# Patient Record
Sex: MALE | Race: WHITE | NOT HISPANIC OR LATINO | Employment: OTHER | URBAN - METROPOLITAN AREA
[De-identification: names, ages, dates, MRNs, and addresses within clinical notes are randomized per-mention and may not be internally consistent; named-entity substitution may affect disease eponyms.]

---

## 2024-04-05 PROBLEM — M54.17 LUMBOSACRAL RADICULOPATHY AT L4: Status: ACTIVE | Noted: 2024-04-05

## 2024-04-05 PROBLEM — M19.90 DJD (DEGENERATIVE JOINT DISEASE): Status: ACTIVE | Noted: 2024-04-05

## 2024-04-05 PROBLEM — M25.511 RIGHT SHOULDER PAIN: Status: ACTIVE | Noted: 2024-04-05

## 2024-04-05 PROBLEM — M10.9 GOUT: Status: ACTIVE | Noted: 2024-04-05

## 2024-04-05 PROBLEM — E78.5 HYPERLIPIDEMIA: Status: ACTIVE | Noted: 2024-04-05

## 2024-04-05 PROBLEM — M25.551 RIGHT HIP PAIN: Status: ACTIVE | Noted: 2024-04-05

## 2024-04-05 PROBLEM — M41.9 SCOLIOSIS: Status: ACTIVE | Noted: 2024-04-05

## 2024-04-05 PROBLEM — I82.409 DVT (DEEP VENOUS THROMBOSIS) (MULTI): Status: ACTIVE | Noted: 2024-04-05

## 2024-04-05 PROBLEM — M25.561 RIGHT KNEE PAIN: Status: ACTIVE | Noted: 2024-04-05

## 2024-04-05 PROBLEM — M48.061 SPINAL STENOSIS, LUMBAR: Status: ACTIVE | Noted: 2024-04-05

## 2024-04-05 PROBLEM — M53.9 MULTILEVEL DEGENERATIVE DISC DISEASE: Status: ACTIVE | Noted: 2024-04-05

## 2024-04-05 PROBLEM — R73.02 GLUCOSE INTOLERANCE (IMPAIRED GLUCOSE TOLERANCE): Status: ACTIVE | Noted: 2024-04-05

## 2024-04-16 ENCOUNTER — OFFICE VISIT (OUTPATIENT)
Dept: CARDIOLOGY | Facility: CLINIC | Age: 69
End: 2024-04-16
Payer: MEDICARE

## 2024-04-16 VITALS
BODY MASS INDEX: 34.26 KG/M2 | SYSTOLIC BLOOD PRESSURE: 163 MMHG | OXYGEN SATURATION: 96 % | DIASTOLIC BLOOD PRESSURE: 82 MMHG | WEIGHT: 232 LBS | HEART RATE: 66 BPM

## 2024-04-16 DIAGNOSIS — E66.9 OBESITY, CLASS I, BMI 30-34.9: ICD-10-CM

## 2024-04-16 DIAGNOSIS — I10 ESSENTIAL HYPERTENSION: ICD-10-CM

## 2024-04-16 DIAGNOSIS — E78.5 HYPERLIPIDEMIA, UNSPECIFIED HYPERLIPIDEMIA TYPE: Primary | ICD-10-CM

## 2024-04-16 PROBLEM — M17.11 PRIMARY OSTEOARTHRITIS OF RIGHT KNEE: Status: ACTIVE | Noted: 2021-09-10

## 2024-04-16 PROBLEM — Z86.718 HISTORY OF DVT (DEEP VEIN THROMBOSIS): Status: ACTIVE | Noted: 2024-04-16

## 2024-04-16 PROCEDURE — 3008F BODY MASS INDEX DOCD: CPT | Performed by: STUDENT IN AN ORGANIZED HEALTH CARE EDUCATION/TRAINING PROGRAM

## 2024-04-16 PROCEDURE — 3079F DIAST BP 80-89 MM HG: CPT | Performed by: STUDENT IN AN ORGANIZED HEALTH CARE EDUCATION/TRAINING PROGRAM

## 2024-04-16 PROCEDURE — 99213 OFFICE O/P EST LOW 20 MIN: CPT | Performed by: STUDENT IN AN ORGANIZED HEALTH CARE EDUCATION/TRAINING PROGRAM

## 2024-04-16 PROCEDURE — 4004F PT TOBACCO SCREEN RCVD TLK: CPT | Performed by: STUDENT IN AN ORGANIZED HEALTH CARE EDUCATION/TRAINING PROGRAM

## 2024-04-16 PROCEDURE — 1159F MED LIST DOCD IN RCRD: CPT | Performed by: STUDENT IN AN ORGANIZED HEALTH CARE EDUCATION/TRAINING PROGRAM

## 2024-04-16 PROCEDURE — 93005 ELECTROCARDIOGRAM TRACING: CPT | Performed by: STUDENT IN AN ORGANIZED HEALTH CARE EDUCATION/TRAINING PROGRAM

## 2024-04-16 PROCEDURE — 3077F SYST BP >= 140 MM HG: CPT | Performed by: STUDENT IN AN ORGANIZED HEALTH CARE EDUCATION/TRAINING PROGRAM

## 2024-04-16 RX ORDER — IBUPROFEN 800 MG/1
800 TABLET ORAL EVERY 6 HOURS PRN
COMMUNITY

## 2024-04-16 RX ORDER — DULOXETIN HYDROCHLORIDE 30 MG/1
30 CAPSULE, DELAYED RELEASE ORAL DAILY
COMMUNITY

## 2024-04-16 RX ORDER — ACETAMINOPHEN 500 MG
2000 TABLET ORAL
COMMUNITY
Start: 2023-05-25

## 2024-04-16 RX ORDER — AMOXICILLIN 500 MG/1
CAPSULE ORAL
COMMUNITY
Start: 2023-11-29

## 2024-04-16 RX ORDER — LISINOPRIL 5 MG/1
5 TABLET ORAL DAILY
COMMUNITY

## 2024-04-16 RX ORDER — ALLOPURINOL 300 MG/1
300 TABLET ORAL DAILY
COMMUNITY

## 2024-04-16 ASSESSMENT — ENCOUNTER SYMPTOMS
SYNCOPE: 0
PSYCHIATRIC NEGATIVE: 1
PALPITATIONS: 0
EYES NEGATIVE: 1
ALLERGIC/IMMUNOLOGIC NEGATIVE: 1
RESPIRATORY NEGATIVE: 1
NEUROLOGICAL NEGATIVE: 1
DYSPNEA ON EXERTION: 0
NEAR-SYNCOPE: 0
GASTROINTESTINAL NEGATIVE: 1
ORTHOPNEA: 0
HEMATOLOGIC/LYMPHATIC NEGATIVE: 1
CONSTITUTIONAL NEGATIVE: 1
ENDOCRINE NEGATIVE: 1
PND: 0
MUSCULOSKELETAL NEGATIVE: 1

## 2024-04-16 NOTE — PROGRESS NOTES
"    [facility] Cardiology Outpatient Follow-up Visit     Reason for Visit: Follow-up for hypertension, DLD, obesity    HPI: Noah Justice is a 69 y.o.  male who presents today for a follow-up visit.  Past medical history of HTN, DLD, Obesity (BMI), fatty liver, remote hx DVT, hx of IVC filter c/b perforation s/p repair @ [facility], osteoarthritis s/p bilateral total hip arthroplasty (2010), and gout.      Patient follows with his PCP (Dr. Bradshaw). At recent visit patient noted dyspnea. Given patient's CV risk factors, exercise stress echo ordered. Stress Echo was low-risk for ischemia; no ECG / clinical / or echo evidence for ischemia. CT cardiac scoring- total calcium score 0; Abdominal US showed no AAA; mild aortic atherosclerosis. Patient referred to cardiology for discussion of HTN and CV risk factor management.      Noah presented to cardiology clinic on 2/21/2023. No chest pains. Patient notes slight exertional dyspnea for which he attributes to deconditioning / obesity. Patient wants to lose 20-30 lbs. He notes that he has been \"very sedentary\" over past 3-5 years. Plans to increase his physical activity levels and start exercising regularly. Noah notes a history of LE DVTs > ? provoked with total hip arthroplasty; previously on Coumadin. Later, patient underwent IVC filter placement which was then c/b perforation > transferred to [facility] and underwent IVC repair. Noah notes that since his prior DVTs he has slight bilateral LE swelling at baseline.     Patient returned for a follow-up on 8/15/2023. No active cardiac complaints at this time. No chest pains, dypsnea, orthopnea, PND, syncope, palpitations, fever / chills, nausea / vomiting, or pedal edema. Patient initiating Mounjaro (Tirzepatide) for weight loss. HTN is sub-optimally controlled > increase lisinopril to 10 mg daily.      Noah returned to cardiology clinic on 4/16/2024. No active cardiovascular complaints. Interval improvement in weight " loss since our last visit (250 lbs > 232 lbs). Per patient his blood pressure has been running 120s/80s mmHg on lisinopril 5 mg daily. LDL currently well controlled.       Past Medical History:   He has a past medical history of Calculus of kidney (11/22/2021).    Surgical History:   - bilateral hip replacement 2010 (Dr. Carias)  - hx right knee surgery / patellar tendon (~ 3 surgeries)  - hx right shoulder acromioplasty  - Hx ruptured bicep repair  - Hx of IVC filter > c/b perforation s/p repair  - tonsillectomy age 14    Family History:   Family History   Problem Relation Name Age of Onset    Aortic aneurysm Father      Carpal tunnel syndrome Brother         Allergies:  Poison ivy and Tramadol     Social History:   - non-smoker; drinks 2-5 alcoholic drinks per week; no illicit drug use.     Prior Cardiovascular Testing (Personally Reviewed):     ECG (2/21/2023- in office; personally interpreted)- normal sinus rhythm     Lipid Panel (12/2022)- total 192, HDL 48, LDL 81, triglycerides 315 mg/dl     Exercise treadmill stress echo (1/2023)- no clinical,echocardiographic or ECG evidence for ischemia at maximal workload; adequate level of stress achieved; resting LVEF 60-65%, peak exercise 70-75%, no stress induced wma     CT cardiac scoring (2021)- total coronary calcium score 0     Ultrasound AAA screening (1/2021)- no evidence for AAA; atherosclerotic disease involving aorta and iliac arteries      Review of Systems:  Review of Systems   Constitutional: Negative.   HENT: Negative.     Eyes: Negative.    Cardiovascular:  Negative for chest pain, dyspnea on exertion, near-syncope, orthopnea, palpitations, paroxysmal nocturnal dyspnea and syncope.   Respiratory: Negative.     Endocrine: Negative.    Hematologic/Lymphatic: Negative.    Skin: Negative.    Musculoskeletal: Negative.    Gastrointestinal: Negative.    Genitourinary: Negative.    Neurological: Negative.    Psychiatric/Behavioral: Negative.      Allergic/Immunologic: Negative.        Outpatient Medications:    Current Outpatient Medications:     allopurinol (Zyloprim) 300 mg tablet, Take 1 tablet (300 mg) by mouth once daily., Disp: , Rfl:     amoxicillin (Amoxil) 500 mg capsule, TAKE 4 CAPSULES BY MOUTH EVERY 24 HOURS FOR 2 DAYS. TAKE 4 CAPSULES 30 TO 60 MINUTES BEFORE PROCEDURE, Disp: , Rfl:     cholecalciferol (Vitamin D-3) 50 mcg (2,000 unit) capsule, Take 1 capsule (50 mcg) by mouth once daily., Disp: , Rfl:     DULoxetine (Cymbalta) 30 mg DR capsule, Take 1 capsule (30 mg) by mouth once daily., Disp: , Rfl:     ibuprofen 800 mg tablet, Take 1 tablet (800 mg) by mouth every 6 hours if needed., Disp: , Rfl:     lisinopril 5 mg tablet, Take 1 tablet (5 mg) by mouth once daily., Disp: , Rfl:      Last Recorded Vitals  /82 (BP Location: Right arm, Patient Position: Sitting, BP Cuff Size: Adult)   Pulse 66   Wt 105 kg (232 lb)   SpO2 96%   BMI 34.26 kg/m²     Physical Exam:    Physical Exam  Constitutional:       General: He is not in acute distress.     Appearance: He is obese.   HENT:      Head: Normocephalic.      Mouth/Throat:      Mouth: Mucous membranes are moist.   Eyes:      Extraocular Movements: Extraocular movements intact.      Conjunctiva/sclera: Conjunctivae normal.   Neck:      Vascular: No JVD.   Cardiovascular:      Rate and Rhythm: Normal rate and regular rhythm.      Heart sounds: No murmur heard.  Pulmonary:      Effort: Pulmonary effort is normal. No respiratory distress.      Breath sounds: Normal breath sounds.   Abdominal:      General: Bowel sounds are normal. There is no distension.      Palpations: Abdomen is soft.   Musculoskeletal:         General: No swelling.   Skin:     General: Skin is warm and dry.   Neurological:      General: No focal deficit present.      Mental Status: He is alert.      Cranial Nerves: No cranial nerve deficit.      Motor: No weakness.   Psychiatric:         Mood and Affect: Mood normal.     "     Behavior: Behavior normal.         Lab/Radiology/Diagnostic Review:    Labs    Lab Results   Component Value Date    GLUCOSE 96 12/27/2022    CALCIUM 9.2 12/27/2022     12/27/2022    K 4.1 12/27/2022    CO2 29 12/27/2022     12/27/2022    BUN 15 12/27/2022    CREATININE 1.14 12/27/2022       Lab Results   Component Value Date    WBC 6.1 12/27/2022    HGB 13.5 12/27/2022    HCT 41.0 12/27/2022    MCV 96 12/27/2022     12/27/2022       Lab Results   Component Value Date    CHOL 192 12/27/2022    CHOL 179 01/15/2021    CHOL 212 (H) 04/28/2020     Lab Results   Component Value Date    HDL 48.4 12/27/2022    HDL 47.2 01/15/2021    HDL 45.3 04/28/2020     No results found for: \"LDLCALC\"  Lab Results   Component Value Date    TRIG 315 (H) 12/27/2022    TRIG 148 01/15/2021    TRIG 333 (H) 04/28/2020     Lab Results   Component Value Date    TSH 1.54 12/27/2022       Assessment:   69 y.o.  male who presents today for a follow-up visit.  Past medical history of HTN, DLD, Obesity (BMI), fatty liver, remote hx DVT, hx of IVC filter c/b perforation s/p repair @ [facility], osteoarthritis s/p bilateral total hip arthroplasty (2010), and gout.     Noah returned to cardiology clinic on 4/16/2024. No active cardiovascular complaints. Interval improvement in weight loss since our last visit (250 lbs > 232 lbs), encouraged patient's progress. Per patient his blood pressure has been running 120s/80s mmHg on lisinopril 5 mg daily.  LDL currently well controlled. Continue cardiac risk factor management as below.     Overall Plan:  1) HTN (goal BP < 130/90 mmHg)  - per patient BP has been running 120/80 mmHg  - continue lisinopril 6 mg daily, if control sub-optimal would then up-titrate  - encouraged low-sodium diet and regular physical activity; encouraged additional weight loss     2) Obesity (BMI 34; improving)  - continue dietary and lifestyle modifications; encouraged patient's progress     3) DLD (goal LDL " < 100 mg/dl; at goal)  - continue dietary and lifestyle modifications; encouraged regular physical activity    Disposition- Return to cardiology clinic in ~ 1 year or earlier if needed    Thank you for your visit today. Please contact our office with any questions.     Obi Dasilva MD

## 2024-04-16 NOTE — PATIENT INSTRUCTIONS
Thank you for your visit today. Please contact our office (via MyChart or phone) with any additional questions.     [facility] [place]    Zenaida, RN/Clinic Nurse for:    Dr. Brown Stevens    [address], [address]  [place] [address]    Phone: [phone] Press Option 5 then Option 3 to speak with the Clinic Nurse (Zenaida)    _____    To Reach:    Billing Questions -    [phone]  Scheduling / Rescheduling -  Option 1  Refills / Medication Requests -  Option 3  General Office / [place] -  Option 4  Results -     Option 6  Medical Records -    Option 7  Repeat Options -    Option 9

## 2024-04-20 LAB
ATRIAL RATE: 66 BPM
P AXIS: 60 DEGREES
P OFFSET: 203 MS
P ONSET: 147 MS
PR INTERVAL: 146 MS
Q ONSET: 220 MS
QRS COUNT: 11 BEATS
QRS DURATION: 88 MS
QT INTERVAL: 374 MS
QTC CALCULATION(BAZETT): 392 MS
QTC FREDERICIA: 386 MS
R AXIS: 109 DEGREES
T AXIS: 18 DEGREES
T OFFSET: 407 MS
VENTRICULAR RATE: 66 BPM

## 2024-06-14 ENCOUNTER — PATIENT OUTREACH (OUTPATIENT)
Dept: CARE COORDINATION | Facility: CLINIC | Age: 69
End: 2024-06-14
Payer: MEDICARE